# Patient Record
Sex: FEMALE | Race: WHITE | NOT HISPANIC OR LATINO | Employment: UNEMPLOYED | URBAN - METROPOLITAN AREA
[De-identification: names, ages, dates, MRNs, and addresses within clinical notes are randomized per-mention and may not be internally consistent; named-entity substitution may affect disease eponyms.]

---
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
## 2024-04-13 ENCOUNTER — OFFICE VISIT (OUTPATIENT)
Dept: URGENT CARE | Facility: CLINIC | Age: 19
End: 2024-04-13
Payer: COMMERCIAL

## 2024-04-13 VITALS
BODY MASS INDEX: 40.02 KG/M2 | WEIGHT: 255 LBS | RESPIRATION RATE: 18 BRPM | OXYGEN SATURATION: 97 % | HEART RATE: 92 BPM | DIASTOLIC BLOOD PRESSURE: 103 MMHG | TEMPERATURE: 98 F | HEIGHT: 67 IN | SYSTOLIC BLOOD PRESSURE: 151 MMHG

## 2024-04-13 DIAGNOSIS — J02.9 SORE THROAT: ICD-10-CM

## 2024-04-13 DIAGNOSIS — K52.9 GASTROENTERITIS: ICD-10-CM

## 2024-04-13 DIAGNOSIS — R05.9 COUGH, UNSPECIFIED TYPE: Primary | ICD-10-CM

## 2024-04-13 DIAGNOSIS — J06.9 UPPER RESPIRATORY TRACT INFECTION, UNSPECIFIED TYPE: ICD-10-CM

## 2024-04-13 LAB
CTP QC/QA: YES
CTP QC/QA: YES
MOLECULAR STREP A: NEGATIVE
SARS-COV-2 AG RESP QL IA.RAPID: NEGATIVE

## 2024-04-13 PROCEDURE — 99214 OFFICE O/P EST MOD 30 MIN: CPT | Mod: S$GLB,,, | Performed by: NURSE PRACTITIONER

## 2024-04-13 PROCEDURE — 87811 SARS-COV-2 COVID19 W/OPTIC: CPT | Mod: QW,S$GLB,, | Performed by: NURSE PRACTITIONER

## 2024-04-13 PROCEDURE — 87651 STREP A DNA AMP PROBE: CPT | Mod: QW,S$GLB,, | Performed by: NURSE PRACTITIONER

## 2024-04-13 RX ORDER — ONDANSETRON 4 MG/1
4 TABLET, ORALLY DISINTEGRATING ORAL EVERY 6 HOURS PRN
Qty: 10 TABLET | Refills: 0 | Status: SHIPPED | OUTPATIENT
Start: 2024-04-13 | End: 2024-04-16

## 2024-04-13 NOTE — PROGRESS NOTES
"Subjective:      Patient ID: Payton Washington is a 19 y.o. female.    Vitals:  height is 5' 7" (1.702 m) and weight is 115.7 kg (255 lb). Her oral temperature is 97.6 °F (36.4 °C). Her blood pressure is 151/103 (abnormal) and her pulse is 92. Her respiration is 18 and oxygen saturation is 97%.     Chief Complaint: Generalized Body Aches    Diarrhea   This is a new problem. Episode onset: 2 days ago. The problem occurs 2 to 4 times per day. The problem has been gradually worsening. The stool consistency is described as Watery. The patient states that diarrhea awakens her from sleep. Associated symptoms include abdominal pain, chills, coughing, a fever, headaches, sweats and vomiting. Pertinent negatives include no arthralgias, bloating, increased  flatus, myalgias, URI or weight loss. She has tried electrolyte solution for the symptoms. The treatment provided no relief. There is no history of bowel resection, inflammatory bowel disease, irritable bowel syndrome, malabsorption, a recent abdominal surgery or short gut syndrome.       Constitution: Positive for chills and fever.   Respiratory:  Positive for cough.    Gastrointestinal:  Positive for abdominal pain, vomiting and diarrhea.   Musculoskeletal:  Negative for joint pain and muscle ache.   Neurological:  Positive for headaches.      Objective:     Physical Exam   Constitutional: She is oriented to person, place, and time. normal  HENT:   Nose: Congestion present.   Mouth/Throat: Posterior oropharyngeal erythema present.   Cardiovascular: Normal rate.   Pulmonary/Chest: No stridor. No respiratory distress. She has no wheezes. She has no rhonchi. She has no rales. She exhibits no tenderness.   Abdominal: Normal appearance. She exhibits no distension and no mass. There is no abdominal tenderness. There is no rebound, no guarding and no right CVA tenderness. No hernia.   Musculoskeletal: Normal range of motion.         General: Normal range of motion.   Neurological: " no focal deficit. She is alert, oriented to person, place, and time and at baseline.   Skin: Skin is warm. Capillary refill takes less than 2 seconds.   Nursing note and vitals reviewed.    Assessment:     1. Cough, unspecified type    2. Sore throat    3. Gastroenteritis    4. Upper respiratory tract infection, unspecified type      Results for orders placed or performed in visit on 04/13/24   SARS Coronavirus 2 Antigen, POCT Manual Read   Result Value Ref Range    SARS Coronavirus 2 Antigen Negative Negative     Acceptable Yes    POCT Strep A, Molecular   Result Value Ref Range    Molecular Strep A, POC Negative Negative     Acceptable Yes          Plan:       Cough, unspecified type  -     SARS Coronavirus 2 Antigen, POCT Manual Read    Sore throat  -     POCT Strep A, Molecular    Gastroenteritis    Upper respiratory tract infection, unspecified type    Other orders  -     ondansetron (ZOFRAN-ODT) 4 MG TbDL; Take 1 tablet (4 mg total) by mouth every 6 (six) hours as needed (nausea).  Dispense: 10 tablet; Refill: 0

## 2024-04-13 NOTE — LETTER
April 13, 2024      [facility]  [address]  [place] [address]  Phone: [phone]  Fax: [phone]       Patient: Payton Washington   YOB: 2005  Date of Visit: 04/13/2024    To Whom It May Concern:    Marcial Washington  was at [facility] on 04/13/2024. The patient may return to work/school on 4/16/2024 with no restrictions. If you have any questions or concerns, or if I can be of further assistance, please do not hesitate to contact me.    Sincerely,    Jami Hatch, NP

## 2025-04-02 NOTE — PROGRESS NOTES
"Subjective:       Patient ID: Payton Washington is a 14 y.o. female.    Vitals:  height is 5' 5" (1.651 m) and weight is 94.3 kg (208 lb). Her oral temperature is 98.6 °F (37 °C). Her blood pressure is 122/78 and her pulse is 83. Her respiration is 18 and oxygen saturation is 99%.     Chief Complaint: Sore Throat    This is a 14 y.o. female who presents today with a chief complaint of Sore throat.    Sore Throat   This is a new problem. The current episode started in the past 7 days. The problem occurs constantly. The problem has been gradually worsening. Associated symptoms include abdominal pain, coughing, headaches and a sore throat. Pertinent negatives include no chest pain, chills, diaphoresis, fever, nausea or vomiting. She has tried nothing for the symptoms.   Constipation   This is a new problem. The current episode started 1 to 4 weeks ago. The problem has been gradually worsening since onset. The patient is not on a high fiber diet. She exercises regularly. There has been adequate water intake. Associated symptoms include abdominal pain. Pertinent negatives include no back pain, diarrhea, fever, nausea or vomiting. Treatments tried: miralax. The treatment provided no relief. There is no history of abdominal surgery. She has been eating and drinking normally. She has been behaving normally.       Constitution: Negative for appetite change, chills, sweating and fever.   HENT: Positive for sore throat. Negative for trouble swallowing.    Cardiovascular: Negative for chest pain.   Respiratory: Positive for cough. Negative for shortness of breath.    Gastrointestinal: Positive for abdominal pain and constipation. Negative for abdominal trauma, abdominal bloating, history of abdominal surgery, nausea, vomiting, diarrhea, dark colored stools and heartburn.   Genitourinary: Negative for dysuria, missed menses and pelvic pain.   Musculoskeletal: Negative for back pain.   Neurological: Positive for headaches.     "   Objective:      Physical Exam   Constitutional: She is oriented to person, place, and time. She appears well-developed and well-nourished. She is cooperative.  Non-toxic appearance. She does not have a sickly appearance. She does not appear ill. No distress.   HENT:   Head: Normocephalic and atraumatic.   Right Ear: Hearing, external ear and ear canal normal. A middle ear effusion is present.   Left Ear: Hearing, external ear and ear canal normal. A middle ear effusion is present.   Nose: Mucosal edema present. No rhinorrhea or nasal deformity. No epistaxis. Right sinus exhibits maxillary sinus tenderness. Right sinus exhibits no frontal sinus tenderness. Left sinus exhibits maxillary sinus tenderness. Left sinus exhibits no frontal sinus tenderness.   Mouth/Throat: Uvula is midline and mucous membranes are normal. No trismus in the jaw. Normal dentition. No uvula swelling. Posterior oropharyngeal erythema present. No oropharyngeal exudate or posterior oropharyngeal edema.   Eyes: Conjunctivae and lids are normal. No scleral icterus.   Neck: Trachea normal, full passive range of motion without pain and phonation normal. Neck supple. No neck rigidity. No edema and no erythema present.   Cardiovascular: Normal rate, regular rhythm, normal heart sounds, intact distal pulses and normal pulses.   Pulmonary/Chest: Effort normal and breath sounds normal. No respiratory distress. She has no decreased breath sounds. She has no rhonchi.   Abdominal: Normal appearance.   Musculoskeletal: Normal range of motion. She exhibits no edema or deformity.   Lymphadenopathy:     She has cervical adenopathy.        Right cervical: Superficial cervical adenopathy present.        Left cervical: Superficial cervical adenopathy present.   Neurological: She is alert and oriented to person, place, and time. She exhibits normal muscle tone. Coordination normal.   Skin: Skin is warm, dry, intact, not diaphoretic and not pale.   Psychiatric:  She has a normal mood and affect. Her speech is normal and behavior is normal. Judgment and thought content normal. Cognition and memory are normal.   Nursing note and vitals reviewed.        Assessment:       1. Acute bacterial sinusitis    2. Nausea    3. Cough    4. Sore throat    5. Body aches        Plan:         Acute bacterial sinusitis  -     azithromycin (Z-MAGGIE) 250 MG tablet; Take 2 tablets (500 mg total) by mouth once daily for 1 day, THEN 1 tablet (250 mg total) once daily for 4 days. One Z-Pack as directed.  Dispense: 6 tablet; Refill: 0    Nausea    Cough    Sore throat  -     POCT rapid strep A    Body aches  -     POCT Influenza A/B    Take over-the-counter medications as directed.    Results for orders placed or performed in visit on 10/10/19   POCT Influenza A/B   Result Value Ref Range    Rapid Influenza A Ag Negative Negative    Rapid Influenza B Ag Negative Negative     Acceptable Yes    POCT rapid strep A   Result Value Ref Range    Rapid Strep A Screen Negative Negative     Acceptable Yes      Patient Instructions   Always follow your healthcare professional's instructions.    You have received urgent care diagnosis and treatment and you may be released before all of your medical problems are known or treated. Unless you have been given a referral, you (the patient), will arrange for follow-up care as instructed.     If you have been given a referral,  will contact you (their phone number is [phone]). If they do NOT call you by the end of the business day, please call them the following day.    Acute Sinusitis    Acute sinusitis is irritation and swelling of the sinuses. It is usually caused by a viral infection after a common cold. Your doctor can help you find relief.  What is acute sinusitis?  Sinuses are air-filled spaces in the skull behind the face. They are kept moist and clean by a lining of mucosa. Things such as pollen, smoke, and  chemical fumes can irritate the mucosa. It can then swell up. As a response to irritation, the mucosa makes more mucus and other fluids. Tiny hairlike cilia cover the mucosa. Cilia help carry mucus toward the opening of the sinus. Too much mucus may cause the cilia to stop working. This blocks the sinus opening. A buildup of fluid in the sinuses then causes pain and pressure. It can also encourage bacteria to grow in the sinuses.  Common symptoms of acute sinusitis  You may have:  · Facial soreness pain  · Headache  · Fever  · Fluid draining in the back of the throat (postnasal drip)  · Congestion  · Drainage that is thick and colored, instead of clear  · Cough  Diagnosing acute sinusitis  Your doctor will ask about your symptoms and health history. He or she will look at your ear, nose, and throat. You usually won't need to have X-rays taken.    The doctor may take a sample of mucus to check for bacteria. If you have sinusitis that keeps coming back, you may need imaging tests such as X-rays or CAT scans. This will help your doctor check for a structural problem that may be causing the infection.  Treating acute sinusitis  Treatment is aimed at unblocking the sinus opening and helping the cilia work again. You may need to take antihistamine and decongestant medicine. These can reduce inflammation and decrease the amount of fluid your sinuses make. If you have a bacterial infection, you will need to take antibiotic medicine for 10 to 14 days. Take this medicine until it is gone, even if you feel better.  Date Last Reviewed: 10/1/2016  © 6781-8647 [employer]. [address] [place] [address], [place] [address]. All rights reserved. This information is not intended as a substitute for professional medical care. Always follow your healthcare professional's instructions.         Children 1 yr and older: [employer] Naturals, Children's Cough Syrup with Dark Honey   Children 2 yrs old and older:   [employer] 4 Kids  Cough Syrup   o Cough expectorants such as guaifenesin. Examples are: Mucinex (guaifenesin)  o Decongestants such as pseudoephedrine. Example: Children's Sudafed   Children 4 yrs old and older:   o [employer] 4 Kids Cough Syrup   o Cough suppressants such as dextromethorphan (DM). Examples: Delsym, Robitussin, Vicks DayQuil, and Creomulsion  o Decongestants such as phenylephrine. Example: Children's Sudafed PE  Please follow up with your primary care provider within 5-7 days if your signs and symptoms have not resolved or worsen.     If your condition worsens or fails to improve we recommend that you receive another evaluation at the emergency room immediately or contact your primary care provider to discuss your concerns.     You have received urgent care diagnosis and treatment and you may be released before all of your medical problems are known or treated. Unless you have been given a referral, you (the patient), will arrange for follow-up care as instructed. If you have been given a referral, [facility] will contact you (there phone number is [phone])    Take the following over-the-counter medications: Claritin, zyrtec, allegra, OR xyzal as directed, Flonase as directed for sinus congestion and postnasal drip; and If you can tolerate Benadryl at night time, this will help you to sleep and will work to dry up secretions.    Your provider discussed your plan of care with you during your physical exam. It was reviewed once more by the provider when giving you an after visit summary. If the patient is a minor, the discharge instructions were discussed with an adult and that adult acknowledge their understanding of the provider's teaching.              [General Appearance - Alert] : alert [General Appearance - In No Acute Distress] : in no acute distress [Oriented To Time, Place, And Person] : oriented to person, place, and time [Affect] : the affect was normal [FreeTextEntry1] : anxious